# Patient Record
Sex: FEMALE | Race: WHITE
[De-identification: names, ages, dates, MRNs, and addresses within clinical notes are randomized per-mention and may not be internally consistent; named-entity substitution may affect disease eponyms.]

---

## 2018-11-09 ENCOUNTER — HOSPITAL ENCOUNTER (OUTPATIENT)
Dept: HOSPITAL 42 - ED | Age: 38
Setting detail: OBSERVATION
LOS: 1 days | Discharge: LEFT BEFORE DISCHARGE | End: 2018-11-10
Attending: INTERNAL MEDICINE | Admitting: INTERNAL MEDICINE
Payer: COMMERCIAL

## 2018-11-09 VITALS — BODY MASS INDEX: 11.2 KG/M2

## 2018-11-09 DIAGNOSIS — N92.1: ICD-10-CM

## 2018-11-09 DIAGNOSIS — D25.1: ICD-10-CM

## 2018-11-09 DIAGNOSIS — D25.0: ICD-10-CM

## 2018-11-09 DIAGNOSIS — D64.9: Primary | ICD-10-CM

## 2018-11-09 LAB
ALBUMIN SERPL-MCNC: 4.3 G/DL (ref 3–4.8)
ALBUMIN/GLOB SERPL: 1.4 {RATIO} (ref 1.1–1.8)
ALT SERPL-CCNC: 26 U/L (ref 7–56)
APTT BLD: 31 SECONDS (ref 25.1–36.5)
AST SERPL-CCNC: 17 U/L (ref 14–36)
BUN SERPL-MCNC: 17 MG/DL (ref 7–21)
CALCIUM SERPL-MCNC: 9.3 MG/DL (ref 8.4–10.5)
ERYTHROCYTE [DISTWIDTH] IN BLOOD BY AUTOMATED COUNT: 21.3 % (ref 11.5–14.5)
GFR NON-AFRICAN AMERICAN: > 60
HGB BLD-MCNC: 7.3 G/DL (ref 12–16)
INR PPP: 1.03
MCH RBC QN AUTO: 15.4 PG (ref 25–35)
MCHC RBC AUTO-ENTMCNC: 28.1 G/DL (ref 31–37)
MCV RBC AUTO: 55 FL (ref 80–105)
PLATELET # BLD: 392 10^3/UL (ref 120–450)
PROTHROMBIN TIME: 11.7 SECONDS (ref 9.4–12.5)
RBC # BLD AUTO: 4.73 10^6/UL (ref 3.5–6.1)
TROPONIN I SERPL-MCNC: < 0.01 NG/ML
WBC # BLD AUTO: 6.9 10^3/UL (ref 4.5–11)

## 2018-11-09 PROCEDURE — 85730 THROMBOPLASTIN TIME PARTIAL: CPT

## 2018-11-09 PROCEDURE — 83001 ASSAY OF GONADOTROPIN (FSH): CPT

## 2018-11-09 PROCEDURE — 86920 COMPATIBILITY TEST SPIN: CPT

## 2018-11-09 PROCEDURE — 86900 BLOOD TYPING SEROLOGIC ABO: CPT

## 2018-11-09 PROCEDURE — 86880 COOMBS TEST DIRECT: CPT

## 2018-11-09 PROCEDURE — 93005 ELECTROCARDIOGRAM TRACING: CPT

## 2018-11-09 PROCEDURE — 96374 THER/PROPH/DIAG INJ IV PUSH: CPT

## 2018-11-09 PROCEDURE — 36415 COLL VENOUS BLD VENIPUNCTURE: CPT

## 2018-11-09 PROCEDURE — 84466 ASSAY OF TRANSFERRIN: CPT

## 2018-11-09 PROCEDURE — 85025 COMPLETE CBC W/AUTO DIFF WBC: CPT

## 2018-11-09 PROCEDURE — 82728 ASSAY OF FERRITIN: CPT

## 2018-11-09 PROCEDURE — 99285 EMERGENCY DEPT VISIT HI MDM: CPT

## 2018-11-09 PROCEDURE — 83615 LACTATE (LD) (LDH) ENZYME: CPT

## 2018-11-09 PROCEDURE — 86850 RBC ANTIBODY SCREEN: CPT

## 2018-11-09 PROCEDURE — 71045 X-RAY EXAM CHEST 1 VIEW: CPT

## 2018-11-09 PROCEDURE — 36430 TRANSFUSION BLD/BLD COMPNT: CPT

## 2018-11-09 PROCEDURE — 84443 ASSAY THYROID STIM HORMONE: CPT

## 2018-11-09 PROCEDURE — 82550 ASSAY OF CK (CPK): CPT

## 2018-11-09 PROCEDURE — 84100 ASSAY OF PHOSPHORUS: CPT

## 2018-11-09 PROCEDURE — 85610 PROTHROMBIN TIME: CPT

## 2018-11-09 PROCEDURE — 84484 ASSAY OF TROPONIN QUANT: CPT

## 2018-11-09 PROCEDURE — 80053 COMPREHEN METABOLIC PANEL: CPT

## 2018-11-09 PROCEDURE — 83735 ASSAY OF MAGNESIUM: CPT

## 2018-11-09 PROCEDURE — 85027 COMPLETE CBC AUTOMATED: CPT

## 2018-11-09 PROCEDURE — 76830 TRANSVAGINAL US NON-OB: CPT

## 2018-11-09 PROCEDURE — 85044 MANUAL RETICULOCYTE COUNT: CPT

## 2018-11-09 PROCEDURE — 83002 ASSAY OF GONADOTROPIN (LH): CPT

## 2018-11-09 NOTE — ED PDOC
Arrival/HPI





- General


Chief Complaint: Abnormal Labs


Time Seen by Provider: 18 20:49


Historian: Patient





- History of Present Illness


Narrative History of Present Illness (Text): 


18 21:32


Sonya Gonzalez is a 38 year old female, whose past medical history includes 

anemia, who presents to the Emergency department accompanied by  for 

abnormal labs.  states patient recently underwent routing bloodwork and 

was advised to come to the Emergency department by her PMD due to a low 

hemoglobin in 7s. Patient has been complaining of dizziness, fatigue, exertional

chest tightness, and dyspnea on exertion.  notes patient has been 

transfused once in the past, and has a history of heavy menstruation. Patient 

reports she is on Day 7 of her menses. Patient denies any abdominal pain, 

urinary symptoms, fever, chills, back pain, neck pain, headache, or any other 

complaints. 


Symptom Onset: Gradual


Symptom Course: Unchanged


Activities at Onset: Light


Context: Home





Past Medical History





- Provider Review


Nursing Documentation Reviewed: Yes





- Infectious Disease


Hx of Infectious Diseases: None





- Tetanus Immunization


Tetanus Immunization: Unknown





- Past Medical History


Past Medical History: No Previous





- Cardiac


Hx Pacemaker: No





- Pulmonary


Hx Respiratory Disorders: No





- Neurological


Hx Neurological Disorder: No





- HEENT


Hx HEENT Disorder: No





- Renal


Hx Renal Disorder: No





- Endocrine/Metabolic


Hx Endocrine Disorders: No





- Hematological/Oncological


Hx Blood Transfusions: Yes


Hx Blood Transfusion Reaction: No





- Integumentary


Hx Dermatological Disorder: No





- Musculoskeletal/Rheumatological


Hx Musculoskeletal Disorders: No


Hx Falls: No





- Gastrointestinal


Hx Gastrointestinal Disorders: No





- Genitourinary/Gynecological


Hx Genitourinary Disorders: No





- Psychiatric


Hx Psychophysiologic Disorder: No


Hx Substance Use: No





- Surgical History


Hx  Section: Yes





- Anesthesia


Hx Anesthesia: Yes


Hx Anesthesia Reactions: No


Hx Malignant Hyperthermia: No





- Suicidal Assessment


Feels Threatened In Home Enviroment: No





Family/Social History





- Physician Review


Nursing Documentation Reviewed: Yes


Family/Social History: Unknown Family HX


Smoking Status: Light Smoker < 10 Cigarettes Daily


Hx Alcohol Use: No


Hx Substance Use: No


Hx Substance Use Treatment: No





Allergies/Home Meds


Allergies/Adverse Reactions: 


Allergies





guaifenesin [From Robitussin] Allergy (Verified 17 21:32)


   SHORTNESS OF BREATH








Home Medications: 


                                    Home Meds











 Medication  Instructions  Recorded  Confirmed


 


No Known Home Med  17














Review of Systems





- Physician Review


All systems were reviewed & negative as marked: Yes





- Review of Systems


Constitutional: Fatigue.  absent: Fevers


Eyes: Normal


ENT: Normal


Cardiovascular: Chest Pain, SABA


Gastrointestinal: Normal.  absent: Abdominal Pain, Vomiting


Genitourinary Female: Normal.  absent: Dysuria, Frequency, Hematuria, Urine 

Output Changes


Musculoskeletal: Normal.  absent: Back Pain, Neck Pain


Skin: Normal.  absent: Rash


Neurological: Dizziness.  absent: Headache


Endocrine: Normal


Hemo/Lymphatic: Normal


Psychiatric: Normal





Physical Exam


Vital Signs Reviewed: Yes





Vital Signs











  Temp Pulse Resp BP Pulse Ox


 


 18 21:08  97.5 F L  75  18  133/69  100











Temperature: Afebrile


Blood Pressure: Normal


Pulse: Regular


Respiratory Rate: Normal


Appearance: Positive for: Well-Appearing, Non-Toxic, Comfortable


Pain Distress: None


Mental Status: Positive for: Alert and Oriented X 3





- Systems Exam


Head: Present: Atraumatic, Normocephalic


Pupils: Present: PERRL


Extroacular Muscles: Present: EOMI


Conjunctiva: Present: Other (Pale conjunctiva)


Mouth: Present: Moist Mucous Membranes


Neck: Present: Normal Range of Motion


Respiratory/Chest: Present: Clear to Auscultation, Good Air Exchange.  No: 

Respiratory Distress, Accessory Muscle Use


Cardiovascular: Present: Regular Rate and Rhythm, Normal S1, S2.  No: Murmurs


Abdomen: No: Tenderness, Distention, Peritoneal Signs


Back: Present: Normal Inspection


Upper Extremity: Present: Normal Inspection.  No: Cyanosis, Edema


Lower Extremity: Present: Normal Inspection.  No: Edema


Neurological: Present: GCS=15, CN II-XII Intact, Speech Normal


Skin: Present: Warm, Dry, Normal Color.  No: Rashes


Psychiatric: Present: Alert, Oriented x 3, Normal Insight, Normal Concentration





Medical Decision Making


ED Course and Treatment: 


18 21:32


Impression:


38 year old female sent by PMD for low hemoglobin, pt complaining of dizziness, 

fatigue, dyspnea on exertion, and exerational chest tightness.





Plan:


-- EKG


-- Chest X-ray


-- Labs, blood type and screen, cardiac enzymes


-- Reassess and disposition





Progress Notes:


Reviewed EKG, NSR at 66 bpm. No ST-segment elevations or depressions, no T-wave 

inversions, normal intervals.





18 23:03


Labs reviewed, hgb: 7.3, hct: 26. Will transfuse pt. 





18 23:55


Chest X-ray reviewed, shows no acute processes.





11/10/18 00:13


Case discussed with medical resident on call, who is aware and agrees with plan.





11/10/18 00:52


Case discussed with Dr. SIMEON Bojorquez, who is aware and agrees with plan.  Accepts pt 

in to hospitalist service. Pt will go to remote telemetry observation for anemia

and chest pain.





- Lab Interpretations


I have reviewed the lab results: Yes





- RAD Interpretation


: ED Physician





- EKG Interpretation


Interpreted by ED Physician: Yes


Type: 12 lead EKG





- Scribe Statement


The provider has reviewed the documentation as recorded by the Scribrene Broderick





Provider Scribe Attestation:


All medical record entries made by the Scribe were at my direction and 

personally dictated by me. I have reviewed the chart and agree that the record 

accurately reflects my personal performance of the history, physical exam, 

medical decision making, and the department course for this patient. I have also

personally directed, reviewed, and agree with the discharge instructions and 

disposition.








Disposition/Present on Arrival





- Present on Arrival


Any Indicators Present on Arrival: No


History of DVT/PE: No


History of Uncontrolled Diabetes: No


Urinary Catheter: No


History of Decub. Ulcer: No


History Surgical Site Infection Following: None





- Disposition


Have Diagnosis and Disposition been Completed?: Yes


Diagnosis: 


 Symptomatic anemia, Chest pain





Disposition: HOSPITALIZED


Disposition Time: 00:10


Condition: STABLE

## 2018-11-10 VITALS
DIASTOLIC BLOOD PRESSURE: 54 MMHG | HEART RATE: 74 BPM | TEMPERATURE: 98.5 F | SYSTOLIC BLOOD PRESSURE: 101 MMHG | RESPIRATION RATE: 16 BRPM

## 2018-11-10 VITALS — OXYGEN SATURATION: 98 %

## 2018-11-10 LAB
% IRON SATURATION: 6 % (ref 20–55)
ALBUMIN SERPL-MCNC: 3.7 G/DL (ref 3–4.8)
ALBUMIN/GLOB SERPL: 1.2 {RATIO} (ref 1.1–1.8)
ALT SERPL-CCNC: 23 U/L (ref 7–56)
AST SERPL-CCNC: 41 U/L (ref 14–36)
BASOPHILS # BLD AUTO: 0.01 K/MM3 (ref 0–2)
BASOPHILS NFR BLD: 0.2 % (ref 0–3)
BUN SERPL-MCNC: 18 MG/DL (ref 7–21)
CALCIUM SERPL-MCNC: 8.5 MG/DL (ref 8.4–10.5)
EOSINOPHIL # BLD: 0.2 10*3/UL (ref 0–0.7)
EOSINOPHIL NFR BLD: 3.3 % (ref 1.5–5)
ERYTHROCYTE [DISTWIDTH] IN BLOOD BY AUTOMATED COUNT: 25.4 % (ref 11.5–14.5)
ERYTHROCYTE [DISTWIDTH] IN BLOOD BY AUTOMATED COUNT: 25.8 % (ref 11.5–14.5)
FSH SERPL-ACNC: 5.2 MIU/ML
GFR NON-AFRICAN AMERICAN: > 60
GRANULOCYTES # BLD: 2.87 10*3/UL (ref 1.4–6.5)
GRANULOCYTES NFR BLD: 47.2 % (ref 50–68)
HGB BLD-MCNC: 8.3 G/DL (ref 12–16)
HGB BLD-MCNC: 8.5 G/DL (ref 12–16)
IRON SERPL-MCNC: 22 UG/DL (ref 45–180)
LYMPHOCYTES # BLD: 2.6 10*3/UL (ref 1.2–3.4)
LYMPHOCYTES NFR BLD AUTO: 43 % (ref 22–35)
MCH RBC QN AUTO: 16.9 PG (ref 25–35)
MCH RBC QN AUTO: 16.9 PG (ref 25–35)
MCHC RBC AUTO-ENTMCNC: 28.9 G/DL (ref 31–37)
MCHC RBC AUTO-ENTMCNC: 28.9 G/DL (ref 31–37)
MCV RBC AUTO: 58.5 FL (ref 80–105)
MCV RBC AUTO: 58.6 FL (ref 80–105)
MONOCYTES # BLD AUTO: 0.4 10*3/UL (ref 0.1–0.6)
MONOCYTES NFR BLD: 6.3 % (ref 1–6)
PLATELET # BLD: 297 10^3/UL (ref 120–450)
PLATELET # BLD: 375 10^3/UL (ref 120–450)
RBC # BLD AUTO: 4.91 10^6/UL (ref 3.5–6.1)
RBC # BLD AUTO: 5.02 10^6/UL (ref 3.5–6.1)
TIBC SERPL-MCNC: 368 UG/DL (ref 265–497)
TRANSFERRIN SERPL-MCNC: 281.59 MG/DL (ref 206–381)
WBC # BLD AUTO: 4.9 10^3/UL (ref 4.5–11)
WBC # BLD AUTO: 6.1 10^3/UL (ref 4.5–11)

## 2018-11-10 NOTE — CP.PCM.HP
History of Present Illness





- History of Present Illness


History of Present Illness: 


Norm Day, , PGY-1 Hospitalist Admission History and Physical for Dr. ISELA Bojorquez


CC: fatigue, SOB, low Hgb found on outpatient labs


HPI: Ms. Gonzalez is a 38 year old female with PMH of anemia and uterine 

fibroids who presented to ED with worsening fatigue and SOB with exertion. She 

states that for the past few weeks she has felt more fatigued and is now getting

short of breath with exertion. She feels unable to climb a flight of stairs now 

without getting fatigued and short of breath. She admits to heavy and irregular 

vaginal bleeding that has been going on for the past two years and worsened over

the past week. She admits to a history of a similar episode around this time 

last year. She was found to be anemic on admission at that time with Hgb of 5.2.

She was transfused at that time and reports she felt better after the 

transfusion. She has also seen Dr. Vela in the past and had received IV iron. 

She admits to trying to take PO iron but she has not been taking it regularly. 

She endorses intermittent chest tightness with breathing with SOB and fatigue 

but denies fever/chills, palpitations, abdominal pain/nausea/vomiting, HA, 

blurred vision, or syncope.





PMD: Mike


Past Medical Hx: anemia, menometorrhagia, fibroids


Past Surgical Hx: D & C


Allergies: guaifenesin


Home medications: none


Family Hx: mother and father both have DM2 and HTN


Social Hx: admits to hookah smoking occasionally but denies smoking cigarettes. 

Denies alcohol or illicit drug use. She works as a manager at a local store.


Pharmacy: no preferred pharmacy








Present on Admission





- Present on Admission


Any Indicators Present on Admission: No


History of DVT/PE: No


History of Uncontrolled Diabetes: No


Urinary Catheter: No


Decubitus Ulcer Present: No





Review of Systems





- Constitutional


Constitutional: Fatigue.  absent: Chills, Fever, Night Sweats, Weight Loss





- EENT


Eyes: absent: Blurred Vision, Change in Vision





- Cardiovascular


Cardiovascular: Chest Pain, Dyspnea on Exertion.  absent: Chest Pain at Rest, 

Edema, Pain Radiating to Arm/Neck/Jaw, Orthopnea, Palpitations, Paroxysmal 

Nocturnal Dyspnea





- Respiratory


Respiratory: Dyspnea on Exertion.  absent: Cough





- Gastrointestinal


Gastrointestinal: absent: Abdominal Pain, Nausea, Vomiting





- Genitourinary


Genitourinary: absent: Change in Urinary Stream, Difficulty Urinating





- Reproductive: Female


Reproductive:Female: Heavy Menses.  absent: Vaginal Discharge, Vaginal Dryness





- Menstruation


Menstruation: Menses Variable, Heavy Menses





- Neurological


Neurological: absent: Headaches, Syncope





Past Patient History





- Infectious Disease


Hx of Infectious Diseases: None





- Tetanus Immunizations


Tetanus Immunization: Unknown





- Past Social History


Smoking Status: Light Smoker < 10 Cigarettes Daily





- CARDIAC


Hx Pacemaker: No





- PULMONARY


Hx Respiratory Disorders: No





- NEUROLOGICAL


Hx Neurological Disorder: No





- HEENT


Hx HEENT Problems: No





- RENAL


Hx Chronic Kidney Disease: No





- ENDOCRINE/METABOLIC


Hx Endocrine Disorders: No





- HEMATOLOGICAL/ONCOLOGICAL


Hx Blood Transfusions: Yes


Hx Blood Transfusion Reaction: No





- INTEGUMENTARY


Hx Dermatological Problems: No





- MUSCULOSKELETAL/RHEUMATOLOGICAL


Hx Musculoskeletal Disorders: No


Hx Falls: No





- GASTROINTESTINAL


Hx Gastrointestinal Disorders: No





- GENITOURINARY/GYNECOLOGICAL


Hx Genitourinary Disorders: No





- PSYCHIATRIC


Hx Psychophysiologic Disorder: No


Hx Substance Use: No





- SURGICAL HISTORY


Hx  Section: Yes





- ANESTHESIA


Hx Anesthesia: Yes


Hx Anesthesia Reactions: No


Hx Malignant Hyperthermia: No





Meds


Allergies/Adverse Reactions: 


                                    Allergies











Allergy/AdvReac Type Severity Reaction Status Date / Time


 


guaifenesin [From Robitussin] Allergy  SHORTNESS Verified 17 21:32





   OF BREATH  














Physical Exam





- Constitutional


Appears: Non-toxic, No Acute Distress





- Head Exam


Head Exam: ATRAUMATIC, NORMOCEPHALIC





- Eye Exam


Eye Exam: EOMI, Normal appearance, PERRL





- ENT Exam


ENT Exam: Mucous Membranes Moist





- Neck Exam


Neck exam: Positive for: Full Rom, Normal Inspection





- Respiratory Exam


Respiratory Exam: Clear to Auscultation Bilateral, NORMAL BREATHING PATTERN.  

absent: Rales, Rhonchi, Wheezes





- Cardiovascular Exam


Cardiovascular Exam: REGULAR RHYTHM, RRR, +S1, +S2.  absent: Diastolic murmur, 

Gallop, Rubs, Systolic Murmur





- GI/Abdominal Exam


GI & Abdominal Exam: Normal Bowel Sounds, Soft.  absent: Guarding, Tenderness





- Extremities Exam


Extremities exam: Positive for: normal inspection.  Negative for: pedal edema





- Back Exam


Back exam: NORMAL INSPECTION





- Neurological Exam


Neurological exam: Alert, Oriented x3





- Psychiatric Exam


Psychiatric exam: Normal Affect, Normal Mood





- Skin


Skin Exam: Dry, Intact, Warm





Results





- Vital Signs


Recent Vital Signs: 





                                Last Vital Signs











Temp  98.6 F   11/10/18 01:01


 


Pulse  71   11/10/18 01:27


 


Resp  18   11/10/18 01:27


 


BP  106/71   11/10/18 01:01


 


Pulse Ox  100   11/10/18 01:27














- Labs


Result Diagrams: 


                                 18 21:58





                                 18 21:58


Labs: 





                         Laboratory Results - last 24 hr











  18





  21:58 21:58 21:58


 


WBC    6.9


 


RBC    4.73


 


Hgb    7.3 L D


 


Hct    26.0 L


 


MCV    55.0 L D


 


MCH    15.4 L


 


MCHC    28.1 L


 


RDW    21.3 H


 


Plt Count    392


 


PT  11.7  


 


INR  1.03  


 


APTT  31.0  


 


Sodium   140 


 


Potassium   4.1 


 


Chloride   105 


 


Carbon Dioxide   25 


 


Anion Gap   13 


 


BUN   17 


 


Creatinine   0.7 


 


Est GFR ( Amer)   > 60 


 


Est GFR (Non-Af Amer)   > 60 


 


Random Glucose   85 


 


Calcium   9.3 


 


Total Bilirubin   0.2 


 


AST   17 


 


ALT   26 


 


Alkaline Phosphatase   40 


 


Lactate Dehydrogenase   473 


 


Total Creatine Kinase   52 


 


Troponin I   < 0.01 


 


Total Protein   7.4 


 


Albumin   4.3 


 


Globulin   3.1 


 


Albumin/Globulin Ratio   1.4 


 


Blood Type   


 


Antibody Screen   


 


Crossmatch   


 


BBK History Checked   














  18





  22:00


 


WBC 


 


RBC 


 


Hgb 


 


Hct 


 


MCV 


 


MCH 


 


MCHC 


 


RDW 


 


Plt Count 


 


PT 


 


INR 


 


APTT 


 


Sodium 


 


Potassium 


 


Chloride 


 


Carbon Dioxide 


 


Anion Gap 


 


BUN 


 


Creatinine 


 


Est GFR ( Amer) 


 


Est GFR (Non-Af Amer) 


 


Random Glucose 


 


Calcium 


 


Total Bilirubin 


 


AST 


 


ALT 


 


Alkaline Phosphatase 


 


Lactate Dehydrogenase 


 


Total Creatine Kinase 


 


Troponin I 


 


Total Protein 


 


Albumin 


 


Globulin 


 


Albumin/Globulin Ratio 


 


Blood Type  A POSITIVE


 


Antibody Screen  Negative


 


Crossmatch  See Detail


 


BBK History Checked  Patient has bt














Assessment & Plan





- Assessment and Plan (Free Text)


Assessment: 





37 yo F with PMH and anemia and uterine fibroids presents to ED with fatigue and

SOB found to have H/H of 7.3/26. She is s/p 1 unit of PRBCs given in ED.


Plan: 





1. Fatigue/SOB


Likely 2/2 worsening microcytic anemia from menometorrhagia


S/p transfusion of 1 u PRBCs in ED


Monitor repeat CBC in AM


Had a similar episode approximately 1 year ago when Hgb was 5.7


She admits to following up with Dr. Vela as an outpatient for IV iron


Will get Fe studies, retic count, indirect and direct opal test


Consider peripheral smear


Dr. Vela heme/onc consulted, recs appreciated





2. Menometorrhagia


Likely 2/2 uterine fibroids which patient states she has been diagnosed with


Will get pelvic US to visualize fibroids


Will get FSH/LH to assess for premature menopause


Patient states she has seen gynecologist at [facility] in the past


Will need close outpatient gynecology f/u 





3. Hepatosplenomegaly found on prior CTAP


May be 2/2 another underlying hematologic disorder such as hemolysis


May need additional w/u and/or monitoring


Dr. Vela heme/onc consulted, recs appreciated





DVT/GI PPX: SCD/protonix


Full Code 


Regular diet


Monitor on med/surg 





Case and plan reviewed and discussed with my attending Dr. ISELA Day,  


IM Resident PGY-1

## 2018-11-10 NOTE — US
Date of service: 



11/10/2018



HISTORY:

vaginal bleeding



COMPARISON:

None available.



TECHNIQUE:

Transabdominal and transvaginal pelvic ultrasound was performed.



FINDINGS:



UTERUS:

Measures 10.2 x 3.4 x 6.2 cm. Anteverted, normal in size and 

appearance.  There is a 10 x 7 x 8 mm anterior wall intramural 

fibroid in the midbody of the uterus. 



ENDOMETRIUM:

Measures 16 mm in diameter. There is small amount of fluid in the 

endometrial canal.  There are at least 4 subcentimeter polypoid 

echogenic lesions in the endometrial cavity, the largest measures 0.8 

x 0.6 x 1.1 cm.  No significant vascularity on color Doppler imaging. 



CERVIX:

No cervical abnormality identified.



RIGHT OVARY:

Measures 4.6 x 2.7 x 3.0 cm. No solid mass. Normal flow. There is a 

1.6 x 1.2 x 1.2 cm septated cyst



LEFT OVARY:

Measures 3.2 x 2.0 x 2.6 cm. No solid mass. Normal flow. 



FREE FLUID:

There is trace free fluid in the pelvis.



OTHER FINDINGS:

None. 



IMPRESSION:

1.  Multiple polypoid lesions in the endometrial canal, the largest 

measures 1.1 cm in maximum dimension most likely endometrial polyps, 

the other differential consideration being submucosal fibroids. 



2.  10 x 7 x 8 mm anterior wall intramural fibroid in the midbody of 

the uterus.

## 2018-11-10 NOTE — RAD
Date of service: 



11/09/2018



HISTORY:

 chest tightness 



COMPARISON:

11/04/2017 



FINDINGS:



LUNGS:

The lungs are well inflated and clear.



PLEURA:

No pleural effusions or pneumothorax. 



CARDIOVASCULAR:

The heart is normal in size.  No aortic atherosclerotic calcification 

present. 



OSSEOUS STRUCTURES:

Within normal limits for the patient's age.



VISUALIZED UPPER ABDOMEN:

Normal.



OTHER FINDINGS:

None.



IMPRESSION:

No active pulmonary disease.

## 2018-11-10 NOTE — CP.PCM.DIS
<Missael Oliveira - Last Filed: 11/11/18 14:38>





Provider





- Provider


Date of Admission: 


11/10/18 00:10





Attending physician: 


Ritu Mireles MD





Primary care physician: 


Misti Mckeon MD





Consults: 





Heme/Onc: Dr. Veal


Time Spent in preparation of Discharge (in minutes): 40





Hospital Course





- Lab Results


Lab Results: 


                             Most Recent Lab Values











WBC  4.9 10^3/uL (4.5-11.0)   11/10/18  13:40    


 


RBC  5.02 10^6/uL (3.5-6.1)   11/10/18  13:40    


 


Hgb  8.5 g/dL (12.0-16.0)  L  11/10/18  13:40    


 


Hct  29.4 % (36.0-48.0)  L  11/10/18  13:40    


 


MCV  58.6 fl (80.0-105.0)  L  11/10/18  13:40    


 


MCH  16.9 pg (25.0-35.0)  L  11/10/18  13:40    


 


MCHC  28.9 g/dl (31.0-37.0)  L  11/10/18  13:40    


 


RDW  25.4 % (11.5-14.5)  H  11/10/18  13:40    


 


Plt Count  375 10^3/uL (120.0-450.0)   11/10/18  13:40    


 


Gran %  47.2 % (50.0-68.0)  L  11/10/18  06:15    


 


Lymph % (Auto)  43.0 % (22.0-35.0)  H  11/10/18  06:15    


 


Mono % (Auto)  6.3 % (1.0-6.0)  H  11/10/18  06:15    


 


Eos % (Auto)  3.3 % (1.5-5.0)   11/10/18  06:15    


 


Baso % (Auto)  0.2 % (0.0-3.0)   11/10/18  06:15    


 


Gran #  2.87  (1.4-6.5)   11/10/18  06:15    


 


Lymph # (Auto)  2.6  (1.2-3.4)   11/10/18  06:15    


 


Mono # (Auto)  0.4  (0.1-0.6)   11/10/18  06:15    


 


Eos # (Auto)  0.2  (0.0-0.7)   11/10/18  06:15    


 


Baso # (Auto)  0.01 K/mm3 (0.0-2.0)   11/10/18  06:15    


 


Retic Count  0.99 % (0.5-1.5)   11/10/18  06:15    


 


PT  11.7 SECONDS (9.4-12.5)   11/09/18  21:58    


 


INR  1.03   11/09/18  21:58    


 


APTT  31.0 Seconds (25.1-36.5)   11/09/18  21:58    


 


Sodium  140 mmol/L (132-148)   11/10/18  06:15    


 


Potassium  4.1 mmol/L (3.6-5.0)   11/10/18  06:15    


 


Chloride  111 mmol/L ()  H  11/10/18  06:15    


 


Carbon Dioxide  23 mmol/L (21-33)   11/10/18  06:15    


 


Anion Gap  10  (10-20)   11/10/18  06:15    


 


BUN  18 mg/dL (7-21)   11/10/18  06:15    


 


Creatinine  0.7 mg/dl (0.7-1.2)   11/10/18  06:15    


 


Est GFR ( Amer)  > 60   11/10/18  06:15    


 


Est GFR (Non-Af Amer)  > 60   11/10/18  06:15    


 


Random Glucose  97 mg/dL ()   11/10/18  06:15    


 


Calcium  8.5 mg/dL (8.4-10.5)   11/10/18  06:15    


 


Phosphorus  3.5 mg/dL (2.5-4.5)   11/10/18  06:15    


 


Magnesium  1.9 mg/dL (1.7-2.2)   11/10/18  06:15    


 


Iron  22 ug/dL ()  L  11/10/18  06:15    


 


TIBC  368 ug/dL (265-497)   11/10/18  06:15    


 


% Saturation  6 % (20-55)  L  11/10/18  06:15    


 


Transferrin  281.59 mg/dL (206-381)   11/10/18  06:15    


 


Ferritin  3.8 ng/mL  11/10/18  07:30    


 


Total Bilirubin  0.3 mg/dL (0.2-1.3)   11/10/18  06:15    


 


AST  41 U/L (14-36)  H D 11/10/18  06:15    


 


ALT  23 U/L (7-56)   11/10/18  06:15    


 


Alkaline Phosphatase  41 U/L ()   11/10/18  06:15    


 


Lactate Dehydrogenase  473 U/L (333-699)   11/09/18  21:58    


 


Total Creatine Kinase  52 U/L ()   11/09/18  21:58    


 


Troponin I  < 0.01 ng/mL  11/10/18  13:40    


 


Total Protein  6.8 g/dL (5.8-8.3)   11/10/18  06:15    


 


Albumin  3.7 g/dL (3.0-4.8)   11/10/18  06:15    


 


Globulin  3.1 gm/dL  11/10/18  06:15    


 


Albumin/Globulin Ratio  1.2  (1.1-1.8)   11/10/18  06:15    


 


TSH 3rd Generation  3.50 mIU/mL (0.46-4.68)   11/10/18  06:15    


 


FSH 3rd Generation  5.2 mIU/mL  11/10/18  06:15    


 


Luteinizing Hormone  9.7 mIU/mL  11/10/18  06:15    


 


Blood Type  A POSITIVE   11/09/18  22:00    


 


Antibody Screen  Negative   11/09/18  22:00    


 


PREM, Poly Interpret  Negative  (NEGATIVE)   11/10/18  08:05    


 


Crossmatch  See Detail   11/09/18  22:00    


 


BBK History Checked  Patient has bt   11/09/18  22:00    














- Hospital Course


Hospital Course: 





Patient is a 38 year old female with past medical history of uterine fibroids, 

heavy menses and microcytic anemia previously seen by Dr. Vela who presented to

[facility] [place], [place] ED complaining of worsening fatigue and shortness of breath with exertion. 

Patient was evaluated in emergency department and found to have low hemoglobin 

of 7. Patient was transfused 1 unit of pRBC with improvement of her symptoms. 

Patient with significant history of heavy menses. She indicates that flow has be

en its usual state. She denied any dark black stool or bright red blood per 

rectum. Patient vital signs remained stable through out her stay. Upon 

reevaluation during her stay the patient was noted to have stable H/H and 

negative troponins x2. Patient reported continued shortness of breath and 

dizziness symptoms. She requested to sign out against medical advice. Patient 

was explained the risks and benefits of signing out against medical advice. 

Patient verbally agreeable and in understanding with explanation. All questions 

were answered to patient verbal satisfaction. 





Patient was instructed to follow up with primary care physician, gynecologist 

after leaving hospital. 





Imaging During Hospital Course: 





Transvaginal US(11/10/18) was done showing multiple polypoid lesions in the 

endometrial canal, largest measureing 1.1 cm representing likely endometrial 

polyps vs. submucosal fibroids as well as a 10 x 7 x 8 mm anterior wall 

intramural fibroid in the midbody of the uterus. 





Chest X-ray(11/9/18): 


- Normal, lungs well inflated and clear








- Date & Time of H&P


Date of H&P: 11/10/18





Discharge Exam





- Head Exam


Head Exam: ATRAUMATIC, NORMOCEPHALIC





- Eye Exam


Eye Exam: EOMI, PERRL





- ENT Exam


ENT Exam: Mucous Membranes Moist





- Neck Exam


Neck exam: Full Rom





- Respiratory Exam


Respiratory Exam: Clear to PA & Lateral, NORMAL BREATHING PATTERN





- Cardiovascular Exam


Cardiovascular Exam: REGULAR RHYTHM, +S1, +S2





- GI/Abdominal Exam


GI & Abdominal Exam: Normal Bowel Sounds, Unremarkable





- Neurological Exam


Neurological exam: Alert, CN II-XII Intact, Normal Gait, Oriented x3, Reflexes 

Normal





- Psychiatric Exam


Psychiatric exam: Normal Affect, Normal Mood





- Skin


Skin Exam: Dry, Intact





Discharge Plan





- Follow Up Plan


Condition: STABLE


Disposition: AGAINST MEDICAL ADVICE


Additional Instructions: 


Patient instructed to follow up with primary care physician withing 3-5 days 

upon discharge


Take medications as prescribed to you


Return to emergency department if patient experience chest pain, difficulty 

breathing, worsening of presenting symptoms 


Referrals: 


Misti Mckeon MD [Primary Care Provider] - 





<Ritu Mireles - Last Filed: 11/11/18 16:38>





Provider





- Provider


Date of Admission: 


11/10/18 00:10





Attending physician: 


Ritu Mireles MD





Primary care physician: 


Misti Mckeon MD








Hospital Course





- Lab Results


Lab Results: 


                             Most Recent Lab Values











WBC  4.9 10^3/uL (4.5-11.0)   11/10/18  13:40    


 


RBC  5.02 10^6/uL (3.5-6.1)   11/10/18  13:40    


 


Hgb  8.5 g/dL (12.0-16.0)  L  11/10/18  13:40    


 


Hct  29.4 % (36.0-48.0)  L  11/10/18  13:40    


 


MCV  58.6 fl (80.0-105.0)  L  11/10/18  13:40    


 


MCH  16.9 pg (25.0-35.0)  L  11/10/18  13:40    


 


MCHC  28.9 g/dl (31.0-37.0)  L  11/10/18  13:40    


 


RDW  25.4 % (11.5-14.5)  H  11/10/18  13:40    


 


Plt Count  375 10^3/uL (120.0-450.0)   11/10/18  13:40    


 


Gran %  47.2 % (50.0-68.0)  L  11/10/18  06:15    


 


Lymph % (Auto)  43.0 % (22.0-35.0)  H  11/10/18  06:15    


 


Mono % (Auto)  6.3 % (1.0-6.0)  H  11/10/18  06:15    


 


Eos % (Auto)  3.3 % (1.5-5.0)   11/10/18  06:15    


 


Baso % (Auto)  0.2 % (0.0-3.0)   11/10/18  06:15    


 


Gran #  2.87  (1.4-6.5)   11/10/18  06:15    


 


Lymph # (Auto)  2.6  (1.2-3.4)   11/10/18  06:15    


 


Mono # (Auto)  0.4  (0.1-0.6)   11/10/18  06:15    


 


Eos # (Auto)  0.2  (0.0-0.7)   11/10/18  06:15    


 


Baso # (Auto)  0.01 K/mm3 (0.0-2.0)   11/10/18  06:15    


 


Retic Count  0.99 % (0.5-1.5)   11/10/18  06:15    


 


PT  11.7 SECONDS (9.4-12.5)   11/09/18  21:58    


 


INR  1.03   11/09/18  21:58    


 


APTT  31.0 Seconds (25.1-36.5)   11/09/18  21:58    


 


Sodium  140 mmol/L (132-148)   11/10/18  06:15    


 


Potassium  4.1 mmol/L (3.6-5.0)   11/10/18  06:15    


 


Chloride  111 mmol/L ()  H  11/10/18  06:15    


 


Carbon Dioxide  23 mmol/L (21-33)   11/10/18  06:15    


 


Anion Gap  10  (10-20)   11/10/18  06:15    


 


BUN  18 mg/dL (7-21)   11/10/18  06:15    


 


Creatinine  0.7 mg/dl (0.7-1.2)   11/10/18  06:15    


 


Est GFR ( Amer)  > 60   11/10/18  06:15    


 


Est GFR (Non-Af Amer)  > 60   11/10/18  06:15    


 


Random Glucose  97 mg/dL ()   11/10/18  06:15    


 


Calcium  8.5 mg/dL (8.4-10.5)   11/10/18  06:15    


 


Phosphorus  3.5 mg/dL (2.5-4.5)   11/10/18  06:15    


 


Magnesium  1.9 mg/dL (1.7-2.2)   11/10/18  06:15    


 


Iron  22 ug/dL ()  L  11/10/18  06:15    


 


TIBC  368 ug/dL (265-497)   11/10/18  06:15    


 


% Saturation  6 % (20-55)  L  11/10/18  06:15    


 


Transferrin  281.59 mg/dL (206-381)   11/10/18  06:15    


 


Ferritin  3.8 ng/mL  11/10/18  07:30    


 


Total Bilirubin  0.3 mg/dL (0.2-1.3)   11/10/18  06:15    


 


AST  41 U/L (14-36)  H D 11/10/18  06:15    


 


ALT  23 U/L (7-56)   11/10/18  06:15    


 


Alkaline Phosphatase  41 U/L ()   11/10/18  06:15    


 


Lactate Dehydrogenase  473 U/L (333-699)   11/09/18  21:58    


 


Total Creatine Kinase  52 U/L ()   11/09/18  21:58    


 


Troponin I  < 0.01 ng/mL  11/10/18  13:40    


 


Total Protein  6.8 g/dL (5.8-8.3)   11/10/18  06:15    


 


Albumin  3.7 g/dL (3.0-4.8)   11/10/18  06:15    


 


Globulin  3.1 gm/dL  11/10/18  06:15    


 


Albumin/Globulin Ratio  1.2  (1.1-1.8)   11/10/18  06:15    


 


TSH 3rd Generation  3.50 mIU/mL (0.46-4.68)   11/10/18  06:15    


 


FSH 3rd Generation  5.2 mIU/mL  11/10/18  06:15    


 


Luteinizing Hormone  9.7 mIU/mL  11/10/18  06:15    


 


Blood Type  A POSITIVE   11/09/18  22:00    


 


Antibody Screen  Negative   11/09/18  22:00    


 


PREM, Poly Interpret  Negative  (NEGATIVE)   11/10/18  08:05    


 


Crossmatch  See Detail   11/09/18  22:00    


 


BBK History Checked  Patient has bt   11/09/18  22:00    














Attending/Attestation





- Attestation


I have personally seen and examined this patient.: Yes


I have fully participated in the care of the patient.: Yes


I have reviewed all pertinent clinical information, including history, physical 

exam and plan: Yes


Notes (Text): 





11/10/18


38 year old female with past medical history of uterine fibroids, anemia and 

heavy menses who presented with symptomatic anemia.  She was transfused one unit

prbc and given iv iron the following day.  She had transvaginal ultrasound which

showed multiple polypoid lesions in the endometrial canal.  She was advised to 

follow up with the gynecologist.  She signed out against medical advice later in

the evening.





Ritu Mireles MD


Hospitalist.

## 2018-11-10 NOTE — CARD
--------------- APPROVED REPORT --------------





Date of service: 11/09/2018



EKG Measurement

Heart [id number]

ID [id number]

[id number]

[id number]

[id number]



<Conclusion>

Normal sinus rhythm

Normal ECG